# Patient Record
Sex: MALE | Race: WHITE | NOT HISPANIC OR LATINO
[De-identification: names, ages, dates, MRNs, and addresses within clinical notes are randomized per-mention and may not be internally consistent; named-entity substitution may affect disease eponyms.]

---

## 2024-09-05 PROBLEM — Z00.00 ENCOUNTER FOR PREVENTIVE HEALTH EXAMINATION: Status: ACTIVE | Noted: 2024-09-05

## 2024-09-06 ENCOUNTER — APPOINTMENT (OUTPATIENT)
Dept: COLORECTAL SURGERY | Facility: CLINIC | Age: 44
End: 2024-09-06

## 2024-09-06 ENCOUNTER — NON-APPOINTMENT (OUTPATIENT)
Age: 44
End: 2024-09-06

## 2024-09-06 VITALS
BODY MASS INDEX: 21.97 KG/M2 | SYSTOLIC BLOOD PRESSURE: 160 MMHG | HEART RATE: 51 BPM | TEMPERATURE: 98.4 F | WEIGHT: 140 LBS | HEIGHT: 67 IN | DIASTOLIC BLOOD PRESSURE: 95 MMHG

## 2024-09-06 DIAGNOSIS — K64.8 OTHER HEMORRHOIDS: ICD-10-CM

## 2024-09-06 PROCEDURE — 46221 LIGATION OF HEMORRHOID(S): CPT

## 2024-09-06 PROCEDURE — 99202 OFFICE O/P NEW SF 15 MIN: CPT | Mod: 25

## 2024-09-06 NOTE — PHYSICAL EXAM
[Excoriation] : no perianal excoriation [Normal] : was normal [None] : there was no rectal mass  [de-identified] : Small left lateral and right anterior external hemorrhoid [FreeTextEntry1] : Medical assistant was present for the entire exam.  Anoscopy was performed for evaluation of the patients rectal bleeding  history . The risks, benefits and alternatives were reviewed.  A lighted anoscope was passed into the anal canal and the entire anal mucosal surface was inspected..   The findings revealed moderate/large left lateral internal hemorrhoids. No masses or lesions were identified.  The risks and benefits of rubber band ligation were discussed with the patient including but not limited to bleeding, pain, infection, and the need for future procedures. The anoscope was placed and rubber band ligation was performed of the internal hemorrhoids-left lateral with good result. The patient tolerated the procedure well. Appropriate postprocedure instructions were given to the patient.

## 2024-09-06 NOTE — HISTORY OF PRESENT ILLNESS
[FreeTextEntry1] : 43 y/o M presents for initial evaluation  Reason for visit: hemorrhoid   PMHx: denies  PSHx: denies  Medications: denies  Allergies: NKDA  Family Hx: denies  Smoker: denies   Colonoscopy: denies   Reports history of hemorrhoids since 2020. States has had about 10 RBL ligation with another CRS in the past four years.  Currently with complaints of rectal bleeding and itchiness. Bright red blood can be seen occasionally after a bowel movement as well as couple of hours after a bowel movement which will soil his underwear.  Denies pain. Patient can feel tissue in his rectum that can reduce on its own.  Was prescribed a cream but patient was told to not use it for a prolonged period of time.  Currently using hemorrhoid wipes.    Bowel movement is usually once a day. Stool is soft and formed. Denies constipation. Denies prolonged toilet sitting.  Denies taking any stool softener and fiber supplement.

## 2025-06-13 ENCOUNTER — NON-APPOINTMENT (OUTPATIENT)
Age: 45
End: 2025-06-13

## 2025-06-13 ENCOUNTER — APPOINTMENT (OUTPATIENT)
Dept: COLORECTAL SURGERY | Facility: CLINIC | Age: 45
End: 2025-06-13
Payer: MEDICAID

## 2025-06-13 VITALS
HEIGHT: 67 IN | SYSTOLIC BLOOD PRESSURE: 158 MMHG | WEIGHT: 138 LBS | HEART RATE: 66 BPM | TEMPERATURE: 97.5 F | DIASTOLIC BLOOD PRESSURE: 99 MMHG | BODY MASS INDEX: 21.66 KG/M2

## 2025-06-13 PROCEDURE — 99212 OFFICE O/P EST SF 10 MIN: CPT | Mod: 25

## 2025-06-13 PROCEDURE — 46600 DIAGNOSTIC ANOSCOPY SPX: CPT
